# Patient Record
Sex: MALE | Race: BLACK OR AFRICAN AMERICAN | NOT HISPANIC OR LATINO
[De-identification: names, ages, dates, MRNs, and addresses within clinical notes are randomized per-mention and may not be internally consistent; named-entity substitution may affect disease eponyms.]

---

## 2019-04-08 ENCOUNTER — LABORATORY RESULT (OUTPATIENT)
Age: 29
End: 2019-04-08

## 2019-04-08 ENCOUNTER — APPOINTMENT (OUTPATIENT)
Dept: HEART AND VASCULAR | Facility: CLINIC | Age: 29
End: 2019-04-08
Payer: MEDICAID

## 2019-04-08 VITALS
DIASTOLIC BLOOD PRESSURE: 86 MMHG | HEIGHT: 72 IN | RESPIRATION RATE: 14 BRPM | HEART RATE: 84 BPM | WEIGHT: 242 LBS | BODY MASS INDEX: 32.78 KG/M2 | SYSTOLIC BLOOD PRESSURE: 130 MMHG

## 2019-04-08 DIAGNOSIS — F17.200 NICOTINE DEPENDENCE, UNSPECIFIED, UNCOMPLICATED: ICD-10-CM

## 2019-04-08 DIAGNOSIS — Z78.9 OTHER SPECIFIED HEALTH STATUS: ICD-10-CM

## 2019-04-08 DIAGNOSIS — R21 RASH AND OTHER NONSPECIFIC SKIN ERUPTION: ICD-10-CM

## 2019-04-08 DIAGNOSIS — Z83.3 FAMILY HISTORY OF DIABETES MELLITUS: ICD-10-CM

## 2019-04-08 DIAGNOSIS — Z00.00 ENCOUNTER FOR GENERAL ADULT MEDICAL EXAMINATION W/OUT ABNORMAL FINDINGS: ICD-10-CM

## 2019-04-08 DIAGNOSIS — R01.1 CARDIAC MURMUR, UNSPECIFIED: ICD-10-CM

## 2019-04-08 DIAGNOSIS — R94.31 ABNORMAL ELECTROCARDIOGRAM [ECG] [EKG]: ICD-10-CM

## 2019-04-08 PROCEDURE — 93000 ELECTROCARDIOGRAM COMPLETE: CPT

## 2019-04-08 PROCEDURE — 99204 OFFICE O/P NEW MOD 45 MIN: CPT

## 2019-04-08 PROCEDURE — 93306 TTE W/DOPPLER COMPLETE: CPT

## 2019-04-08 PROCEDURE — 36415 COLL VENOUS BLD VENIPUNCTURE: CPT

## 2019-04-08 NOTE — HISTORY OF PRESENT ILLNESS
[FreeTextEntry1] : Denies Chest Pain, SOB, Dizziness, Leg edema, Orthopnea, PND, Palpitations, Syncope, RUDD, Diaphoresis.\par Echo ordered to evaluate murmur/abnormal ECG noted on exam (see below).

## 2019-04-08 NOTE — DISCUSSION/SUMMARY
[FreeTextEntry1] : Murmur - Echo with normalLV fxn, mild MR; will consider further w/u if sx.'s develop. Discussed at length with patient.\par Blood work drawn. Maintain a low caloric, low sodium, low cholesterol  diet. Dietary counseling given, diet and exercise discussed in detail, weight loss recommended.\par

## 2019-04-08 NOTE — PHYSICAL EXAM
[General Appearance - Well Developed] : well developed [Normal Appearance] : normal appearance [Well Groomed] : well groomed [General Appearance - Well Nourished] : well nourished [No Deformities] : no deformities [General Appearance - In No Acute Distress] : no acute distress [Normal Conjunctiva] : the conjunctiva exhibited no abnormalities [Eyelids - No Xanthelasma] : the eyelids demonstrated no xanthelasmas [Normal Oral Mucosa] : normal oral mucosa [No Oral Pallor] : no oral pallor [No Oral Cyanosis] : no oral cyanosis [Normal Jugular Venous A Waves Present] : normal jugular venous A waves present [Normal Jugular Venous V Waves Present] : normal jugular venous V waves present [No Jugular Venous Huffman A Waves] : no jugular venous huffman A waves [Heart Rate And Rhythm] : heart rate and rhythm were normal [Heart Sounds] : normal S1 and S2 [Systolic grade ___/6] : A grade [unfilled]/6 systolic murmur was heard. [Respiration, Rhythm And Depth] : normal respiratory rhythm and effort [Exaggerated Use Of Accessory Muscles For Inspiration] : no accessory muscle use [Auscultation Breath Sounds / Voice Sounds] : lungs were clear to auscultation bilaterally [Abdomen Soft] : soft [Abdomen Tenderness] : non-tender [Abdomen Mass (___ Cm)] : no abdominal mass palpated [Abnormal Walk] : normal gait [Gait - Sufficient For Exercise Testing] : the gait was sufficient for exercise testing [Nail Clubbing] : no clubbing of the fingernails [Cyanosis, Localized] : no localized cyanosis [Petechial Hemorrhages (___cm)] : no petechial hemorrhages [Skin Color & Pigmentation] : normal skin color and pigmentation [] : no rash [No Venous Stasis] : no venous stasis [Skin Lesions] : no skin lesions [No Skin Ulcers] : no skin ulcer [No Xanthoma] : no  xanthoma was observed [Oriented To Time, Place, And Person] : oriented to person, place, and time [Affect] : the affect was normal [Mood] : the mood was normal [No Anxiety] : not feeling anxious

## 2019-04-09 LAB
ALBUMIN SERPL ELPH-MCNC: 4.6 G/DL
ALP BLD-CCNC: 73 U/L
ALT SERPL-CCNC: 22 U/L
ANION GAP SERPL CALC-SCNC: 13 MMOL/L
AST SERPL-CCNC: 20 U/L
BASOPHILS # BLD AUTO: 0.01 K/UL
BASOPHILS NFR BLD AUTO: 0.2 %
BILIRUB SERPL-MCNC: 0.3 MG/DL
BUN SERPL-MCNC: 15 MG/DL
CALCIUM SERPL-MCNC: 9.6 MG/DL
CHLORIDE SERPL-SCNC: 103 MMOL/L
CHOLEST SERPL-MCNC: 182 MG/DL
CHOLEST/HDLC SERPL: 5.9 RATIO
CO2 SERPL-SCNC: 26 MMOL/L
CREAT SERPL-MCNC: 1.09 MG/DL
EOSINOPHIL # BLD AUTO: 0.27 K/UL
EOSINOPHIL NFR BLD AUTO: 5 %
ESTIMATED AVERAGE GLUCOSE: 120 MG/DL
GLUCOSE SERPL-MCNC: 93 MG/DL
HAV IGM SER QL: NONREACTIVE
HBA1C MFR BLD HPLC: 5.8 %
HBV CORE IGM SER QL: NONREACTIVE
HBV SURFACE AG SER QL: NONREACTIVE
HCT VFR BLD CALC: 44.6 %
HCV AB SER QL: NONREACTIVE
HCV S/CO RATIO: 0.13 S/CO
HDLC SERPL-MCNC: 31 MG/DL
HGB BLD-MCNC: 13.9 G/DL
HIV1+2 AB SPEC QL IA.RAPID: NONREACTIVE
IMM GRANULOCYTES NFR BLD AUTO: 0.4 %
LDLC SERPL CALC-MCNC: 112 MG/DL
LYMPHOCYTES # BLD AUTO: 2.52 K/UL
LYMPHOCYTES NFR BLD AUTO: 46.8 %
MAN DIFF?: NORMAL
MCHC RBC-ENTMCNC: 28.2 PG
MCHC RBC-ENTMCNC: 31.2 GM/DL
MCV RBC AUTO: 90.5 FL
MONOCYTES # BLD AUTO: 0.63 K/UL
MONOCYTES NFR BLD AUTO: 11.7 %
NEUTROPHILS # BLD AUTO: 1.94 K/UL
NEUTROPHILS NFR BLD AUTO: 35.9 %
PLATELET # BLD AUTO: 192 K/UL
POTASSIUM SERPL-SCNC: 4.2 MMOL/L
PROT SERPL-MCNC: 7.8 G/DL
RBC # BLD: 4.93 M/UL
RBC # FLD: 14.8 %
SODIUM SERPL-SCNC: 142 MMOL/L
T3 SERPL-MCNC: 131 NG/DL
T3FREE SERPL-MCNC: 3.59 PG/ML
T3RU NFR SERPL: 1.1 TBI
T4 FREE SERPL-MCNC: 1.1 NG/DL
T4 SERPL-MCNC: 6.5 UG/DL
TRIGL SERPL-MCNC: 195 MG/DL
TSH SERPL-ACNC: 1.18 UIU/ML
WBC # FLD AUTO: 5.39 K/UL

## 2019-04-10 LAB — RPR SER-TITR: NORMAL

## 2019-04-16 DIAGNOSIS — R73.03 PREDIABETES.: ICD-10-CM

## 2019-04-24 PROBLEM — R73.03 PREDIABETES: Status: ACTIVE | Noted: 2019-04-24

## 2019-08-15 ENCOUNTER — APPOINTMENT (OUTPATIENT)
Dept: HEART AND VASCULAR | Facility: CLINIC | Age: 29
End: 2019-08-15

## 2019-08-21 ENCOUNTER — APPOINTMENT (OUTPATIENT)
Dept: HEART AND VASCULAR | Facility: CLINIC | Age: 29
End: 2019-08-21

## 2020-01-21 ENCOUNTER — EMERGENCY (EMERGENCY)
Facility: HOSPITAL | Age: 30
LOS: 0 days | Discharge: HOME | End: 2020-01-21
Admitting: EMERGENCY MEDICINE
Payer: COMMERCIAL

## 2020-01-21 VITALS
OXYGEN SATURATION: 96 % | DIASTOLIC BLOOD PRESSURE: 81 MMHG | SYSTOLIC BLOOD PRESSURE: 136 MMHG | TEMPERATURE: 98 F | RESPIRATION RATE: 18 BRPM | HEART RATE: 68 BPM

## 2020-01-21 DIAGNOSIS — K08.89 OTHER SPECIFIED DISORDERS OF TEETH AND SUPPORTING STRUCTURES: ICD-10-CM

## 2020-01-21 DIAGNOSIS — K02.9 DENTAL CARIES, UNSPECIFIED: ICD-10-CM

## 2020-01-21 PROCEDURE — 99282 EMERGENCY DEPT VISIT SF MDM: CPT

## 2020-01-21 NOTE — ED PROVIDER NOTE - OBJECTIVE STATEMENT
29 y.o male w/ no sig pmhx presents to the ED for evaluation of dental pain x 2 days.  For past 2 days gradual onset throbbing pain tooth #1, mild severity, worse w/ mastication, improved with OTC meds.  Denies sore throat, facial swelling, fever, chills.

## 2020-01-21 NOTE — ED PROVIDER NOTE - NS ED ROS FT
CONST: No fever, chills or bodyaches  EYES: No pain, redness, drainage or visual changes.  ENT: + dental pain. No ear pain or discharge, nasal discharge or congestion. No sore throat  SKIN: No rashes  NEURO: No headache, dizziness, paresthesias

## 2020-01-21 NOTE — ED PROVIDER NOTE - PHYSICAL EXAMINATION
CONST: Well appearing in NAD  EYES: Sclera and conjunctiva clear.  ENT: + decay tooth #1, + TTP, no abscess, no facial swelling, No nasal discharge. Oropharynx normal appearing, no erythema or exudates. Uvula midline.  NECK: Non-tender, no meningeal signs  SKIN: Warm, dry, no acute rashes. Good turgor

## 2020-01-21 NOTE — CONSULT NOTE ADULT - SUBJECTIVE AND OBJECTIVE BOX
Patient is a 29y old  Male who presents with a chief complaint of upper right toothache    HPI: upper right toothache      MEDICATIONS  (STANDING):    MEDICATIONS  (PRN):      Allergies    penicillins (Unknown)    Intolerances    FAMILY HISTORY:    *Last Dental Visit:    Vital Signs Last 24 Hrs  T(C): 36.8 (21 Jan 2020 14:17), Max: 36.8 (21 Jan 2020 14:17)  T(F): 98.2 (21 Jan 2020 14:17), Max: 98.2 (21 Jan 2020 14:17)  HR: 68 (21 Jan 2020 14:17) (68 - 68)  BP: 136/81 (21 Jan 2020 14:17) (136/81 - 136/81)  BP(mean): --  RR: 18 (21 Jan 2020 14:17) (18 - 18)  SpO2: 96% (21 Jan 2020 14:17) (96% - 96%)    EOE:  TMJ ( -  ) clicks                     ( -  ) pops                     ( -  ) crepitus             Mandible <<FROM>>             Facial bones and MOM <<grossly intact>>             (  - ) trismus             (  - ) lymphadenopathy             (  - ) swelling             (  - ) asymmetry             (  - ) palpation             ( - ) dyspnea             (  - ) dysphagia             ( - ) loss of consciousness    IOE:  permanent hard/soft palate:  (   ) palatal torus, <<No pathology noted>>           tongue/FOM <<No pathology noted>>           labial/buccal mucosa <<No pathology noted>>           ( + ) percussion           ( + ) palpation           (  -) swelling            (  -) abscess           (  -) sinus tract    Dentition present: permanent  Mobility: #1 class 3  Caries: #1    *DENTAL RADIOGRAPHS: 1 periapical    RADIOLOGY & ADDITIONAL STUDIES:    *ASSESSMENT: #1 root tips      *PLAN:    PROCEDURE:   Verbal and written consent given.  Anesthesia: 2 caprules septocaine 4% 1:100K epinephrine locally  treatment:     RECOMMENDATIONS:  1) <<   >>  2) Dental F/U with outpatient dentist for comprehensive dental care.   3) If any difficulty swallowing/breathing, fever occur, return to ER.     Resident Name, pager #  Patient is a 29y old  Male who presents with a chief complaint of upper right toothache    HPI: upper right toothache      MEDICATIONS  (STANDING):    MEDICATIONS  (PRN):      Allergies    penicillins (Unknown)    Intolerances    FAMILY HISTORY:    *Last Dental Visit:    Vital Signs Last 24 Hrs  T(C): 36.8 (21 Jan 2020 14:17), Max: 36.8 (21 Jan 2020 14:17)  T(F): 98.2 (21 Jan 2020 14:17), Max: 98.2 (21 Jan 2020 14:17)  HR: 68 (21 Jan 2020 14:17) (68 - 68)  BP: 136/81 (21 Jan 2020 14:17) (136/81 - 136/81)  BP(mean): --  RR: 18 (21 Jan 2020 14:17) (18 - 18)  SpO2: 96% (21 Jan 2020 14:17) (96% - 96%)    EOE:  TMJ ( -  ) clicks                     ( -  ) pops                     ( -  ) crepitus             Mandible <<FROM>>             Facial bones and MOM <<grossly intact>>             (  - ) trismus             (  - ) lymphadenopathy             (  - ) swelling             (  - ) asymmetry             (  - ) palpation             ( - ) dyspnea             (  - ) dysphagia             ( - ) loss of consciousness    IOE:  permanent hard/soft palate:  (   ) palatal torus, <<No pathology noted>>           tongue/FOM <<No pathology noted>>           labial/buccal mucosa <<No pathology noted>>           ( + ) percussion           ( + ) palpation           (  -) swelling            (  -) abscess           (  -) sinus tract    Dentition present: permanent  Mobility: #1 class 3  Caries: #1    *DENTAL RADIOGRAPHS: 1 periapical    RADIOLOGY & ADDITIONAL STUDIES:    *ASSESSMENT: #1 root tips      *PLAN:    PROCEDURE:   Verbal and written consent given.  Anesthesia: 2 caprules septocaine 4% 1:100K epinephrine locally  treatment: 1. Reviewd Medical History  2. Premedication: None  3. Treatment:  - Risks and benefits discussed as per oral surgey sheet dated 7/13/2000. Consent and side site were signed. Administered 2 carpules of 4% Septocaine, 1:100,000 epinephrine via buccal and lingual infiltration.   - Simple extraction #1d, without complications. Hemostasis obtained.   - Post op radiograph taken. FIndings were negative.   - post op instructions given  -prescribed clindamycin 150 mg q6h 7 days and ibuprofen 600 mg q6h as needed  4. No complications  5. Next visit: follow-up with dentist     RECOMMENDATIONS:  1) follow post op instructions  2) Dental F/U with outpatient dentist for comprehensive dental care.   3) If any difficulty swallowing/breathing, fever occur, return to ER.     Ce Armstrong, DMD